# Patient Record
Sex: FEMALE | Race: WHITE | HISPANIC OR LATINO | Employment: FULL TIME | URBAN - METROPOLITAN AREA
[De-identification: names, ages, dates, MRNs, and addresses within clinical notes are randomized per-mention and may not be internally consistent; named-entity substitution may affect disease eponyms.]

---

## 2018-02-12 ENCOUNTER — HOSPITAL ENCOUNTER (EMERGENCY)
Facility: HOSPITAL | Age: 43
Discharge: HOME | End: 2018-02-12
Attending: EMERGENCY MEDICINE
Payer: COMMERCIAL

## 2018-02-12 VITALS
BODY MASS INDEX: 28.35 KG/M2 | WEIGHT: 160 LBS | OXYGEN SATURATION: 97 % | TEMPERATURE: 97 F | DIASTOLIC BLOOD PRESSURE: 81 MMHG | HEIGHT: 63 IN | SYSTOLIC BLOOD PRESSURE: 121 MMHG | RESPIRATION RATE: 18 BRPM | HEART RATE: 63 BPM

## 2018-02-12 DIAGNOSIS — R10.32 LEFT LOWER QUADRANT PAIN: ICD-10-CM

## 2018-02-12 DIAGNOSIS — R30.0 DYSURIA: Primary | ICD-10-CM

## 2018-02-12 LAB
ALBUMIN SERPL BCP-MCNC: 4.2 G/DL
ALP SERPL-CCNC: 56 U/L
ALT SERPL W/O P-5'-P-CCNC: 46 U/L
AMORPH CRY URNS QL MICRO: ABNORMAL
ANION GAP SERPL CALC-SCNC: 9 MMOL/L
AST SERPL-CCNC: 25 U/L
B-HCG UR QL: NEGATIVE
BACTERIA #/AREA URNS HPF: ABNORMAL /HPF
BACTERIA GENITAL QL WET PREP: ABNORMAL
BASOPHILS # BLD AUTO: 0.04 K/UL
BASOPHILS NFR BLD: 0.5 %
BILIRUB SERPL-MCNC: 0.4 MG/DL
BILIRUB UR QL STRIP: NEGATIVE
BUN SERPL-MCNC: 10 MG/DL
CALCIUM SERPL-MCNC: 9.5 MG/DL
CHLORIDE SERPL-SCNC: 103 MMOL/L
CLARITY UR: ABNORMAL
CLUE CELLS VAG QL WET PREP: ABNORMAL
CO2 SERPL-SCNC: 28 MMOL/L
COLOR UR: YELLOW
CREAT SERPL-MCNC: 0.8 MG/DL
CTP QC/QA: YES
DIFFERENTIAL METHOD: ABNORMAL
EOSINOPHIL # BLD AUTO: 0.1 K/UL
EOSINOPHIL NFR BLD: 1.6 %
ERYTHROCYTE [DISTWIDTH] IN BLOOD BY AUTOMATED COUNT: 12.4 %
EST. GFR  (AFRICAN AMERICAN): >60 ML/MIN/1.73 M^2
EST. GFR  (NON AFRICAN AMERICAN): >60 ML/MIN/1.73 M^2
FILAMENT FUNGI VAG WET PREP-#/AREA: ABNORMAL
GLUCOSE SERPL-MCNC: 90 MG/DL
GLUCOSE UR QL STRIP: NEGATIVE
HCT VFR BLD AUTO: 46.5 %
HGB BLD-MCNC: 15.8 G/DL
HGB UR QL STRIP: ABNORMAL
HYALINE CASTS #/AREA URNS LPF: 0 /LPF
KETONES UR QL STRIP: ABNORMAL
LEUKOCYTE ESTERASE UR QL STRIP: ABNORMAL
LIPASE SERPL-CCNC: 49 U/L
LYMPHOCYTES # BLD AUTO: 2.6 K/UL
LYMPHOCYTES NFR BLD: 31.9 %
MCH RBC QN AUTO: 30.9 PG
MCHC RBC AUTO-ENTMCNC: 34 G/DL
MCV RBC AUTO: 91 FL
MICROSCOPIC COMMENT: ABNORMAL
MONOCYTES # BLD AUTO: 1.6 K/UL
MONOCYTES NFR BLD: 18.7 %
NEUTROPHILS # BLD AUTO: 3.9 K/UL
NEUTROPHILS NFR BLD: 47.3 %
NITRITE UR QL STRIP: NEGATIVE
PH UR STRIP: 5 [PH] (ref 5–8)
PLATELET # BLD AUTO: 271 K/UL
PMV BLD AUTO: 11 FL
POTASSIUM SERPL-SCNC: 3.9 MMOL/L
PROT SERPL-MCNC: 7.5 G/DL
PROT UR QL STRIP: ABNORMAL
RBC # BLD AUTO: 5.11 M/UL
RBC #/AREA URNS HPF: 1 /HPF (ref 0–4)
SODIUM SERPL-SCNC: 140 MMOL/L
SP GR UR STRIP: 1.03 (ref 1–1.03)
SPECIMEN SOURCE: ABNORMAL
SQUAMOUS #/AREA URNS HPF: 3 /HPF
T VAGINALIS GENITAL QL WET PREP: ABNORMAL
URN SPEC COLLECT METH UR: ABNORMAL
UROBILINOGEN UR STRIP-ACNC: NEGATIVE EU/DL
WBC # BLD AUTO: 8.27 K/UL
WBC #/AREA URNS HPF: 2 /HPF (ref 0–5)
WBC #/AREA VAG WET PREP: ABNORMAL
YEAST GENITAL QL WET PREP: ABNORMAL

## 2018-02-12 PROCEDURE — 83690 ASSAY OF LIPASE: CPT

## 2018-02-12 PROCEDURE — 80053 COMPREHEN METABOLIC PANEL: CPT

## 2018-02-12 PROCEDURE — 99285 EMERGENCY DEPT VISIT HI MDM: CPT | Mod: 25

## 2018-02-12 PROCEDURE — 87491 CHLMYD TRACH DNA AMP PROBE: CPT

## 2018-02-12 PROCEDURE — 81025 URINE PREGNANCY TEST: CPT | Performed by: NURSE PRACTITIONER

## 2018-02-12 PROCEDURE — 87210 SMEAR WET MOUNT SALINE/INK: CPT

## 2018-02-12 PROCEDURE — 96374 THER/PROPH/DIAG INJ IV PUSH: CPT

## 2018-02-12 PROCEDURE — 63600175 PHARM REV CODE 636 W HCPCS: Performed by: NURSE PRACTITIONER

## 2018-02-12 PROCEDURE — 25000003 PHARM REV CODE 250: Performed by: NURSE PRACTITIONER

## 2018-02-12 PROCEDURE — 96361 HYDRATE IV INFUSION ADD-ON: CPT

## 2018-02-12 PROCEDURE — 81000 URINALYSIS NONAUTO W/SCOPE: CPT

## 2018-02-12 PROCEDURE — 85025 COMPLETE CBC W/AUTO DIFF WBC: CPT

## 2018-02-12 RX ORDER — KETOROLAC TROMETHAMINE 30 MG/ML
10 INJECTION, SOLUTION INTRAMUSCULAR; INTRAVENOUS
Status: COMPLETED | OUTPATIENT
Start: 2018-02-12 | End: 2018-02-12

## 2018-02-12 RX ORDER — PHENAZOPYRIDINE HYDROCHLORIDE 200 MG/1
200 TABLET, FILM COATED ORAL 3 TIMES DAILY
Qty: 6 TABLET | Refills: 0 | Status: SHIPPED | OUTPATIENT
Start: 2018-02-12 | End: 2018-02-22

## 2018-02-12 RX ORDER — DICYCLOMINE HYDROCHLORIDE 20 MG/1
20 TABLET ORAL 2 TIMES DAILY PRN
Qty: 10 TABLET | Refills: 0 | Status: SHIPPED | OUTPATIENT
Start: 2018-02-12 | End: 2018-03-14

## 2018-02-12 RX ORDER — CEPHALEXIN 500 MG/1
500 CAPSULE ORAL
Status: COMPLETED | OUTPATIENT
Start: 2018-02-12 | End: 2018-02-12

## 2018-02-12 RX ORDER — CEPHALEXIN 500 MG/1
500 CAPSULE ORAL EVERY 12 HOURS
Qty: 20 CAPSULE | Refills: 0 | Status: SHIPPED | OUTPATIENT
Start: 2018-02-13 | End: 2018-02-23

## 2018-02-12 RX ORDER — IBUPROFEN 400 MG/1
400 TABLET ORAL EVERY 6 HOURS PRN
Qty: 20 TABLET | Refills: 0 | Status: SHIPPED | OUTPATIENT
Start: 2018-02-12

## 2018-02-12 RX ADMIN — SODIUM CHLORIDE 1000 ML: 0.9 INJECTION, SOLUTION INTRAVENOUS at 06:02

## 2018-02-12 RX ADMIN — KETOROLAC TROMETHAMINE 10 MG: 30 INJECTION, SOLUTION INTRAMUSCULAR at 06:02

## 2018-02-12 RX ADMIN — CEPHALEXIN 500 MG: 500 CAPSULE ORAL at 10:02

## 2018-02-12 NOTE — ED TRIAGE NOTES
"Pt here for fever, lower abdominal pain that radiates to bilateral hips and back of legs; migraine; nausea without vomiting. Pt reports "I have bladder problems from a surgery (hysterectory) in 12/2016 and get UTI's a lot".   "

## 2018-02-13 LAB
C TRACH DNA SPEC QL NAA+PROBE: NOT DETECTED
N GONORRHOEA DNA SPEC QL NAA+PROBE: NOT DETECTED

## 2018-02-13 NOTE — ED PROVIDER NOTES
Encounter Date: 2018    SCRIBE #1 NOTE: I, LivPamelaAmanda Soloang, am scribing for, and in the presence of,  Lakesha Santiago NP. I have scribed the following portions of the note - Other sections scribed: HPI, ROS.       History     Chief Complaint   Patient presents with    Fever     pt c/o fever and body aches since sat, also has lower abd pain and slight dysuria     CC: Fever ; Abdominal Pain    43 y/o female with seizures, frequent UTIs, and hysterectomy presents to the ED c/o acute onset fever (highest 102) that started 2 days ago. The patient attempted Ibuprofen (last dose at 9AM today) with transient relief.     The patient is also c/o lower abdominal pain with associated nausea, dysuria, and urinary frequency. The patient reports frequent UTIs due to a surgical complication that injured her bladder. The patient usually drinks a lot of water to alleviate her symptoms; however, she denies relief for this ep. The patient's last ep of UTI where she had to take antibiotics was 1-2 months ago. The patient denies malodorous urine, vaginal d/c, vaginal bleeding, HA, diarrhea, or emesis. No other symptoms reported.      The history is provided by the patient. No  was used.     Review of patient's allergies indicates:  No Known Allergies  Past Medical History:   Diagnosis Date    Seizures      Past Surgical History:   Procedure Laterality Date     SECTION      HYSTERECTOMY       No family history on file.  Social History   Substance Use Topics    Smoking status: Current Every Day Smoker     Packs/day: 0.50    Smokeless tobacco: Never Used    Alcohol use Yes     Review of Systems   Constitutional: Positive for fever (highest 102). Negative for chills.   HENT: Negative for rhinorrhea.    Eyes: Negative for redness.   Respiratory: Negative for cough and shortness of breath.    Cardiovascular: Negative for chest pain.   Gastrointestinal: Positive for abdominal pain (lower) and nausea.  Negative for diarrhea and vomiting.   Genitourinary: Positive for dysuria and frequency. Negative for difficulty urinating, vaginal bleeding and vaginal discharge.        (-) malodorous urine   Musculoskeletal: Negative for back pain.   Skin: Negative for rash.   Neurological: Negative for headaches.   Psychiatric/Behavioral: Negative.    All other systems reviewed and are negative.      Physical Exam     Initial Vitals [02/12/18 1628]   BP Pulse Resp Temp SpO2   (!) 141/83 85 18 97.4 °F (36.3 °C) 98 %      MAP       102.33         Physical Exam    Constitutional: She appears well-developed and well-nourished. She is not diaphoretic. No distress.   HENT:   Head: Normocephalic and atraumatic.   Neck: Normal range of motion.   Cardiovascular: Normal rate, regular rhythm and normal heart sounds. Exam reveals no gallop and no friction rub.    No murmur heard.  Pulmonary/Chest: Breath sounds normal. No respiratory distress. She has no wheezes. She has no rhonchi. She has no rales.   Abdominal: Soft. Bowel sounds are normal. She exhibits no distension and no mass. There is no hepatosplenomegaly. There is tenderness in the left lower quadrant. There is no rigidity, no rebound, no guarding, no CVA tenderness, no tenderness at McBurney's point and negative Sommers's sign.   Genitourinary: Vagina normal. There is no rash, tenderness, lesion or injury on the right labia. There is no rash, tenderness, lesion or injury on the left labia. Cervix exhibits no motion tenderness, no discharge and no friability. Right adnexum displays no mass, no tenderness and no fullness. Left adnexum displays no mass, no tenderness and no fullness. No erythema, tenderness or bleeding in the vagina. No foreign body in the vagina. No signs of injury around the vagina. No vaginal discharge found.   Musculoskeletal: Normal range of motion.   Neurological: She is alert and oriented to person, place, and time.   Skin: Skin is warm and dry.   Psychiatric:  She has a normal mood and affect. Her behavior is normal.         ED Course   Procedures  Labs Reviewed   URINALYSIS - Abnormal; Notable for the following:        Result Value    Appearance, UA Cloudy (*)     Protein, UA 1+ (*)     Ketones, UA Trace (*)     Occult Blood UA 1+ (*)     Leukocytes, UA 3+ (*)     All other components within normal limits   CBC W/ AUTO DIFFERENTIAL - Abnormal; Notable for the following:     Mono # 1.6 (*)     Mono% 18.7 (*)     All other components within normal limits   COMPREHENSIVE METABOLIC PANEL - Abnormal; Notable for the following:     ALT 46 (*)     All other components within normal limits   URINALYSIS MICROSCOPIC - Abnormal; Notable for the following:     Amorphous, UA Many (*)     All other components within normal limits   VAGINAL SCREEN - Abnormal; Notable for the following:     WBC - Vaginal Screen Few (*)     Bacteria - Vaginal Screen Moderate (*)     All other components within normal limits   C. TRACHOMATIS/N. GONORRHOEAE BY AMP DNA   LIPASE   POCT URINE PREGNANCY             Medical Decision Making:   ED Management:  Physical Exam shows a non-toxic, afebrile, and well appearing female. On examination the abdomen is flat without distention.  There is no surface trauma, scars, incisions.  Bowel sounds are present in all 4 quadrants.  No tenderness, guarding, rigidity to palpation.  No tenderness, masses, pulsation in epigastric area.  No organomegaly.  Negative Sommers sign.  No periumbilical tenderness.  No rebound in the lower quadrants.  Nontender over McBurney's point.  No suprapubic tenderness or distention.  There is tenderness to palpation of the left lower quadrant.  Good femoral pulses bilaterally.  No CVA tenderness.  Pelvic examination reveals a closed cervical os.  No discharge present.  No CMT or friability.  No adnexal tenderness or fullness with palpation.    Vital Signs Are Reassuring. If available, previous records reviewed.   RESULTS:   UPT negative.  UA  cloudy, 1+ protein, trace ketones, 3+ leukocytes.  Gen. screen with moderate amount of bacteria present.  Gonorrhea and chlamydia is pending.  CBC was negativefor leukocytosis  indicating unlikely systemic infection, the H&H was stable and was within normal limits and indicating absence of anemia. The platelet count was normal indicating the absence of a tendency toward bleeding.  The chemistry was negative for hypo-or hyper natremia, kalemia, chloridemia, or other electrolyte abnormalities; BUN and creatinine were within normal limits indicating normal kidney function, ALT and AST were within normal limits indicating normal liver function, there was no transaminitis.  Lipase within normal limits indicating normal pancreatic function.    Ultrasound of the pelvis with status post hysterectomy along with left-sided nephrectomy.  No abnormality in the postoperative bed.  There is an enlarged right ovary.      My overall impression is dysuria. I considered, but at this time, do not suspect pyelonephritis, STI, PID, acute abdominal process, TOA.    She was given IV fluids, Toradol, Keflex with good relief in symptoms.  Vital signs stable.  Afebrile.  I will discharge her home with Keflex to treat urinary symptoms.      ED Course: IVFs, toradol, keflex. D/C Meds: keflex, pyridium, motrin, bentyl. Additional D/C Information: Abdominal pain precautions. The diagnosis, treatment plan, instructions for follow-up and reevaluation with PCP as well as ED return precautions were discussed and understanding was verbalized. All questions or concerns have been addressed.     This case was discussed with and the patient has been examined by Dr. Osorio who is in agreement with my assessment and plan.             Scribe Attestation:   Scribe #1: I performed the above scribed service and the documentation accurately describes the services I performed. I attest to the accuracy of the note.    Attending Attestation:     Physician  Attestation Statement for NP/PA:   I discussed this assessment and plan of this patient with the NP/PA, but I did not personally examine the patient. The face to face encounter was performed by the NP/PA.        Physician Attestation for Scribe:  Physician Attestation Statement for Scribe #1: I, Lakesha Ward - LUANA, reviewed documentation, as scribed by Jasper Ross in my presence, and it is both accurate and complete.                 ED Course      Clinical Impression:   The primary encounter diagnosis was Dysuria. A diagnosis of Left lower quadrant pain was also pertinent to this visit.    Disposition:   Disposition: Discharged  Condition: Stable                        Sadie Osorio MD  02/13/18 0911       Lakesha Ward NP  02/15/18 0036

## 2018-02-13 NOTE — DISCHARGE INSTRUCTIONS
Call and schedule follow-up appointment tomorrow with your primary care doctor.    You have been prescribed an antibiotic.  Take this antibiotic twice a day for the full 10 days until he run out of this medication.     You have been prescribed ibuprofen for pain. This is an Non-Steroidal Anti-Inflammatory (NSAID) Medication. Please do not take any additional NSAIDs while you are taking this medication including (Advil, Aleve, Motrin, Ibuprofen, Mobic\meloxicam, Naprosyn, etc.). Please stop taking this medication if you experience: weakness, itching, yellow skin or eyes, joint pains, vomiting blood, blood or black stools, unusual weight gain, or swelling in your arms, legs, hands, or feet.     Please return to the Emergency Department for any new or worsening symptoms including: abdominal pain, fever, chest pain, shortness of breath, loss of consciousness, dizziness, weakness, or any other concerns.     Please follow up with your Primary Care Provider within in the week. If you do not have one, you may contact the one listed on your discharge paperwork or you may also call the [facility] Appointment Desk at [phone] to schedule an appointment with one.     Please take all medication as prescribed.

## 2021-05-26 ENCOUNTER — OFFICE VISIT (OUTPATIENT)
Dept: URGENT CARE | Facility: CLINIC | Age: 46
End: 2021-05-26
Payer: COMMERCIAL

## 2021-05-26 VITALS
TEMPERATURE: 98 F | HEART RATE: 74 BPM | RESPIRATION RATE: 16 BRPM | OXYGEN SATURATION: 99 % | HEIGHT: 62 IN | SYSTOLIC BLOOD PRESSURE: 134 MMHG | WEIGHT: 162 LBS | DIASTOLIC BLOOD PRESSURE: 86 MMHG | BODY MASS INDEX: 29.81 KG/M2

## 2021-05-26 DIAGNOSIS — M54.42 ACUTE LEFT-SIDED LOW BACK PAIN WITH LEFT-SIDED SCIATICA: Primary | ICD-10-CM

## 2021-05-26 LAB
BILIRUB UR QL STRIP: NEGATIVE
GLUCOSE UR QL STRIP: NEGATIVE
KETONES UR QL STRIP: NEGATIVE
LEUKOCYTE ESTERASE UR QL STRIP: NEGATIVE
PH, POC UA: 7.5 (ref 5–8)
POC BLOOD, URINE: NEGATIVE
POC NITRATES, URINE: NEGATIVE
PROT UR QL STRIP: NEGATIVE
SP GR UR STRIP: 1.01 (ref 1–1.03)
UROBILINOGEN UR STRIP-ACNC: NORMAL (ref 0.1–1.1)

## 2021-05-26 PROCEDURE — 99204 PR OFFICE/OUTPT VISIT, NEW, LEVL IV, 45-59 MIN: ICD-10-PCS | Mod: 25,S$GLB,, | Performed by: PHYSICIAN ASSISTANT

## 2021-05-26 PROCEDURE — 99204 OFFICE O/P NEW MOD 45 MIN: CPT | Mod: 25,S$GLB,, | Performed by: PHYSICIAN ASSISTANT

## 2021-05-26 PROCEDURE — 1125F PR PAIN SEVERITY QUANTIFIED, PAIN PRESENT: ICD-10-PCS | Mod: S$GLB,,, | Performed by: PHYSICIAN ASSISTANT

## 2021-05-26 PROCEDURE — 81003 POCT URINALYSIS, DIPSTICK, AUTOMATED, W/O SCOPE: ICD-10-PCS | Mod: QW,S$GLB,, | Performed by: PHYSICIAN ASSISTANT

## 2021-05-26 PROCEDURE — 3008F PR BODY MASS INDEX (BMI) DOCUMENTED: ICD-10-PCS | Mod: CPTII,S$GLB,, | Performed by: PHYSICIAN ASSISTANT

## 2021-05-26 PROCEDURE — 81003 URINALYSIS AUTO W/O SCOPE: CPT | Mod: QW,S$GLB,, | Performed by: PHYSICIAN ASSISTANT

## 2021-05-26 PROCEDURE — 1125F AMNT PAIN NOTED PAIN PRSNT: CPT | Mod: S$GLB,,, | Performed by: PHYSICIAN ASSISTANT

## 2021-05-26 PROCEDURE — 3008F BODY MASS INDEX DOCD: CPT | Mod: CPTII,S$GLB,, | Performed by: PHYSICIAN ASSISTANT

## 2021-05-26 RX ORDER — NAPROXEN 500 MG/1
500 TABLET ORAL 2 TIMES DAILY WITH MEALS
Qty: 30 TABLET | Refills: 0 | Status: SHIPPED | OUTPATIENT
Start: 2021-05-26 | End: 2021-06-10

## 2021-05-26 RX ORDER — METHOCARBAMOL 500 MG/1
500 TABLET, FILM COATED ORAL NIGHTLY PRN
Qty: 15 TABLET | Refills: 0 | Status: SHIPPED | OUTPATIENT
Start: 2021-05-26 | End: 2021-06-10

## 2021-06-15 ENCOUNTER — OFFICE VISIT (OUTPATIENT)
Dept: URGENT CARE | Facility: CLINIC | Age: 46
End: 2021-06-15
Payer: COMMERCIAL

## 2021-06-15 VITALS
BODY MASS INDEX: 29.81 KG/M2 | HEART RATE: 70 BPM | WEIGHT: 162 LBS | OXYGEN SATURATION: 100 % | RESPIRATION RATE: 16 BRPM | DIASTOLIC BLOOD PRESSURE: 74 MMHG | TEMPERATURE: 98 F | SYSTOLIC BLOOD PRESSURE: 116 MMHG | HEIGHT: 62 IN

## 2021-06-15 DIAGNOSIS — N32.89 BLADDER SPASMS: Primary | ICD-10-CM

## 2021-06-15 DIAGNOSIS — R30.0 DYSURIA: ICD-10-CM

## 2021-06-15 LAB
BILIRUB UR QL STRIP: POSITIVE
GLUCOSE UR QL STRIP: NEGATIVE
KETONES UR QL STRIP: NEGATIVE
LEUKOCYTE ESTERASE UR QL STRIP: NEGATIVE
PH, POC UA: 5.5 (ref 5–8)
POC BLOOD, URINE: NEGATIVE
POC NITRATES, URINE: NEGATIVE
PROT UR QL STRIP: POSITIVE
SP GR UR STRIP: 1.02 (ref 1–1.03)
UROBILINOGEN UR STRIP-ACNC: 1 (ref 0.1–1.1)

## 2021-06-15 PROCEDURE — 3008F PR BODY MASS INDEX (BMI) DOCUMENTED: ICD-10-PCS | Mod: CPTII,S$GLB,, | Performed by: PHYSICIAN ASSISTANT

## 2021-06-15 PROCEDURE — 3008F BODY MASS INDEX DOCD: CPT | Mod: CPTII,S$GLB,, | Performed by: PHYSICIAN ASSISTANT

## 2021-06-15 PROCEDURE — 99214 OFFICE O/P EST MOD 30 MIN: CPT | Mod: 25,S$GLB,, | Performed by: PHYSICIAN ASSISTANT

## 2021-06-15 PROCEDURE — 81003 URINALYSIS AUTO W/O SCOPE: CPT | Mod: QW,S$GLB,, | Performed by: PHYSICIAN ASSISTANT

## 2021-06-15 PROCEDURE — 99214 PR OFFICE/OUTPT VISIT, EST, LEVL IV, 30-39 MIN: ICD-10-PCS | Mod: 25,S$GLB,, | Performed by: PHYSICIAN ASSISTANT

## 2021-06-15 PROCEDURE — 81003 POCT URINALYSIS, DIPSTICK, AUTOMATED, W/O SCOPE: ICD-10-PCS | Mod: QW,S$GLB,, | Performed by: PHYSICIAN ASSISTANT

## 2021-06-15 RX ORDER — PHENAZOPYRIDINE HYDROCHLORIDE 100 MG/1
100 TABLET, FILM COATED ORAL 3 TIMES DAILY PRN
Qty: 9 TABLET | Refills: 0 | Status: SHIPPED | OUTPATIENT
Start: 2021-06-15

## 2021-06-18 ENCOUNTER — TELEPHONE (OUTPATIENT)
Dept: URGENT CARE | Facility: CLINIC | Age: 46
End: 2021-06-18

## 2021-06-18 LAB
BACTERIA UR CULT: NORMAL
BACTERIA UR CULT: NORMAL